# Patient Record
Sex: FEMALE | Race: BLACK OR AFRICAN AMERICAN | NOT HISPANIC OR LATINO
[De-identification: names, ages, dates, MRNs, and addresses within clinical notes are randomized per-mention and may not be internally consistent; named-entity substitution may affect disease eponyms.]

---

## 2018-05-24 ENCOUNTER — RECORD ABSTRACTING (OUTPATIENT)
Age: 68
End: 2018-05-24

## 2018-05-24 DIAGNOSIS — Z78.9 OTHER SPECIFIED HEALTH STATUS: ICD-10-CM

## 2018-05-24 PROBLEM — Z00.00 ENCOUNTER FOR PREVENTIVE HEALTH EXAMINATION: Status: ACTIVE | Noted: 2018-05-24

## 2018-05-24 RX ORDER — MELATONIN 3 MG
25 MCG TABLET ORAL
Refills: 0 | Status: ACTIVE | COMMUNITY

## 2018-07-02 ENCOUNTER — APPOINTMENT (OUTPATIENT)
Dept: BREAST CENTER | Facility: CLINIC | Age: 68
End: 2018-07-02
Payer: MEDICARE

## 2018-07-02 VITALS
DIASTOLIC BLOOD PRESSURE: 72 MMHG | WEIGHT: 175 LBS | HEIGHT: 62 IN | SYSTOLIC BLOOD PRESSURE: 139 MMHG | BODY MASS INDEX: 32.2 KG/M2 | HEART RATE: 80 BPM

## 2018-07-02 PROCEDURE — 99215 OFFICE O/P EST HI 40 MIN: CPT

## 2019-02-22 ENCOUNTER — MOBILE ON CALL (OUTPATIENT)
Age: 69
End: 2019-02-22

## 2019-02-22 ENCOUNTER — OTHER (OUTPATIENT)
Age: 69
End: 2019-02-22

## 2019-07-15 ENCOUNTER — APPOINTMENT (OUTPATIENT)
Dept: BREAST CENTER | Facility: CLINIC | Age: 69
End: 2019-07-15
Payer: MEDICARE

## 2019-07-24 ENCOUNTER — APPOINTMENT (OUTPATIENT)
Dept: BREAST CENTER | Facility: CLINIC | Age: 69
End: 2019-07-24
Payer: MEDICARE

## 2019-07-24 VITALS
HEIGHT: 62 IN | BODY MASS INDEX: 31.65 KG/M2 | DIASTOLIC BLOOD PRESSURE: 73 MMHG | HEART RATE: 79 BPM | SYSTOLIC BLOOD PRESSURE: 138 MMHG | WEIGHT: 172 LBS

## 2019-07-24 DIAGNOSIS — Z86.79 PERSONAL HISTORY OF OTHER DISEASES OF THE CIRCULATORY SYSTEM: ICD-10-CM

## 2019-07-24 DIAGNOSIS — Z86.39 PERSONAL HISTORY OF OTHER ENDOCRINE, NUTRITIONAL AND METABOLIC DISEASE: ICD-10-CM

## 2019-07-24 PROCEDURE — 99215 OFFICE O/P EST HI 40 MIN: CPT

## 2019-07-24 NOTE — HISTORY OF PRESENT ILLNESS
[FreeTextEntry1] :  This is a 67 yo female s/p LPMx/ALND 1999 (Gross/NSLIKYLIE), 3/13, stage 2 triple negative breast cancer. S/p chemo, RTx. She is BRCA negative. She is s/p right core bx 2011 (Sahgal) which showed fat necrosis.\par \par Patient's daughter was diagnosed with ALH/LCIS classic type on 01/30/2017. Patient continues to c/o of occasional right breast pinching which she states is on the left only. She describes it as a "needle/sharp sensation". She is not experiences for one week. Itself results. She is also complaining of sharp pain in the left upper arm which she states lasts a few minutes. She had one episode last week. She also thinks it was due to exit the lining in April 2018 during a vacation. She states this has completely resolved.\par \par She does SBE. \par She has not noticed a change in her breast or a breast lump.\par She has not noticed a change in her nipple or nipple area.\par She has not noticed a change in the skin of the breast.\par She is not experiencing nipple discharge.\par She is not experiencing breast pain. \par She has not noticed a lump or lymph node under the armpit. \par \par BREAST CANCER RISK FACTORS\par Menarche: 13\par Date of LMP: 1999\par Menopause: 48\par Grav: 3     Para: 3\par Age at first live birth: 18\par Nursed: Y\par Hysterectomy: N\par Oophorectomy: N\par OCP: Y 1y\par HRT: N\par Last pap/pelvic exam:  06/2017 WNL\par Related family history: sister ovarian CA 62\par Ashkenazi: N\par Tyrer-Yoan/NCI lifetime risk: n/a\par BRCA testing: Y negative\par \par Last mammogram: 7/12/2019               Location: [place]                  \par Report reviewed.                                                         Images reviewed \par Results: Birads 2\par No evidence of malignancy \par \par Last ultrasound:     7/12/2019                  [place]                  \par Report reviewed.                                                         Images reviewed \par Results: Birads 2\par No evidence of malignancy \par \par Last MRI: 4/3/2019                                     [place]\par Results: birads 2\par postoperative changes bilaterally. no suspicious MRI abnormality.

## 2019-07-24 NOTE — ASSESSMENT
[FreeTextEntry1] : This is a 68-year-old female with history of left triple negative breast cancer stage II, MARGARITO\par Due for bilateral mammogram ultrasound July 2020\par Discussed with her breast MRI for evaluation of left breast pain which has been chronic and given her triple negative breast cancer stage II status\par MRI 2021 given 67yo status, can skip 2020\par We reviewed risk reduction strategies including maintaining a BMI <25, limiting red meat intake and alcoholic beverages to 3 per week and exercise (150 min/ week low intensity or 75 min/week high intensity). And maintaining a normal vitamin D level.\par \par She knows to call or return sooner should any concerns or questions arise.\par

## 2019-07-24 NOTE — PHYSICAL EXAM
[Normocephalic] : normocephalic [Atraumatic] : atraumatic [Supple] : supple [No Supraclavicular Adenopathy] : no supraclavicular adenopathy [Examined in the supine and seated position] : examined in the supine and seated position [Symmetrical] : symmetrical [No dominant masses] : no dominant masses in right breast  [No dominant masses] : no dominant masses left breast [No Nipple Retraction] : no left nipple retraction [No Nipple Discharge] : no left nipple discharge [No Axillary Lymphadenopathy] : no left axillary lymphadenopathy [Soft] : abdomen soft [Not Tender] : non-tender [No Edema] : no edema [No Rashes] : no rashes [No Ulceration] : no ulceration [de-identified] : Status post partial mastectomy left breast moderate brawny induration, slight superior scar retraction 12:00 left nipple areolar complex, no palpable mass [de-identified] : Healed axillary incision [de-identified] : Healed Alejo pattern bilaterally, healed right anterior chest wall port scar [de-identified] : right upper extremity larger than left secondary to vascular insufficiency

## 2020-07-29 ENCOUNTER — APPOINTMENT (OUTPATIENT)
Dept: BREAST CENTER | Facility: CLINIC | Age: 70
End: 2020-07-29
Payer: MEDICARE

## 2020-07-29 VITALS
HEART RATE: 73 BPM | BODY MASS INDEX: 32.2 KG/M2 | DIASTOLIC BLOOD PRESSURE: 87 MMHG | HEIGHT: 62 IN | SYSTOLIC BLOOD PRESSURE: 175 MMHG | WEIGHT: 175 LBS

## 2020-07-29 PROCEDURE — 99215 OFFICE O/P EST HI 40 MIN: CPT

## 2020-07-29 NOTE — HISTORY OF PRESENT ILLNESS
[FreeTextEntry1] :  This is a 70 yo female s/p LPMx/ALND 1999 (Gross/NSAPOLINAR), 3/13, stage 2 triple negative breast cancer. S/p chemo, RTx. She is BRCA negative. She is s/p right core bx 2011 ([facility]) which showed fat necrosis.\par \par Patient's daughter was diagnosed with ALH/LCIS classic type on 01/30/2017. Patient continues to c/o of occasional right breast pinching which she states is on the left only. She describes it as a "needle/sharp sensation". She is not experiences for one week. Itself results. She is also complaining of sharp pain in the left upper arm which she states lasts a few minutes. She had one episode last week. She also thinks it was due tozip lining in April 2018 during a vacation. She states this has completely resolved.\par \par She does SBE. \par She has not noticed a change in her breast or a breast lump.\par She has not noticed a change in her nipple or nipple area.\par She has not noticed a change in the skin of the breast.\par She is not experiencing nipple discharge.\par She is not experiencing breast pain. \par She has not noticed a lump or lymph node under the armpit. \par \par BREAST CANCER RISK FACTORS\par Menarche: 13\par Date of LMP: 1999\par Menopause: 48\par Grav: 3     Para: 3\par Age at first live birth: 18\par Nursed: Y\par Hysterectomy: N\par Oophorectomy: N\par OCP: Y 1y\par HRT: N\par Last pap/pelvic exam:  06/2017 WNL\par Related family history: sister ovarian CA 62\par Ashkenazi: N\par Tyrer-Yoan/NCI lifetime risk: n/a\par BRCA testing: Y negative\par \par Last mammogram: 7/29/2020               [place]                  \par Report reviewed.                                                         Images reviewed \par Results: Birads\par Report pending\par \par Last ultrasound:     7/29/2020              [place]                  \par Report reviewed.                                                         Images reviewed \par Results: Birads 0\par Report pending\par \par Last MRI: 4/3/2019                                     [place]\par Results: birads 2\par postoperative changes bilaterally. no suspicious MRI abnormality.

## 2020-07-29 NOTE — ASSESSMENT
[FreeTextEntry1] : This is a 69-year-old female with history of left triple negative breast cancer stage II, MARGARITO\par Due for bilateral mammogram ultrasound July 2021- as long as everything normal today, recalled for motion artifact\par Discussed with her breast MRI for evaluation of left breast pain which has been chronic and given her triple negative breast cancer stage II status\par MRI 2021 given 67yo status, can skip 2020\par We reviewed risk reduction strategies including maintaining a BMI <25, limiting red meat intake and alcoholic beverages to 3 per week and exercise (150 min/ week low intensity or 75 min/week high intensity). And maintaining a normal vitamin D level.\par cont surveillance with Dr. Joseph\par \par She knows to call or return sooner should any concerns or questions arise.\par

## 2020-07-29 NOTE — REVIEW OF SYSTEMS
[Night Sweats] : night sweats [Tinnitus] : tinnitus [Snoring] : snoring [Negative] : Heme/Lymph [Joint Pain] : joint pain [Itching] : itching [Sleep Disturbances] : sleep disturbances [Fever] : no fever [Recent ___ Lb Weight Gain] : no recent weight gain [Earache] : no earache [Constipation] : no constipation [Dizziness] : no dizziness

## 2020-07-29 NOTE — PHYSICAL EXAM
[Normocephalic] : normocephalic [Atraumatic] : atraumatic [Supple] : supple [No Supraclavicular Adenopathy] : no supraclavicular adenopathy [Examined in the supine and seated position] : examined in the supine and seated position [Symmetrical] : symmetrical [No dominant masses] : no dominant masses in right breast  [No dominant masses] : no dominant masses left breast [No Nipple Retraction] : no left nipple retraction [No Nipple Discharge] : no left nipple discharge [No Axillary Lymphadenopathy] : no right axillary lymphadenopathy [Soft] : abdomen soft [Not Tender] : non-tender [No Edema] : no edema [No Rashes] : no rashes [No Ulceration] : no ulceration [de-identified] : Healed Alejo pattern bilaterally, healed right anterior chest wall port scar [de-identified] : Status post partial mastectomy left breast moderate brawny induration, slight superior scar retraction 12:00 left nipple areolar complex, no palpable mass [de-identified] : Healed axillary incision [de-identified] : right upper extremity larger than left secondary to vascular insufficiency

## 2021-08-09 ENCOUNTER — NON-APPOINTMENT (OUTPATIENT)
Age: 71
End: 2021-08-09

## 2021-08-09 ENCOUNTER — APPOINTMENT (OUTPATIENT)
Dept: BREAST CENTER | Facility: CLINIC | Age: 71
End: 2021-08-09
Payer: MEDICARE

## 2021-08-09 VITALS
DIASTOLIC BLOOD PRESSURE: 88 MMHG | HEIGHT: 62 IN | WEIGHT: 175 LBS | HEART RATE: 70 BPM | SYSTOLIC BLOOD PRESSURE: 189 MMHG | BODY MASS INDEX: 32.2 KG/M2

## 2021-08-09 DIAGNOSIS — Z80.0 FAMILY HISTORY OF MALIGNANT NEOPLASM OF DIGESTIVE ORGANS: ICD-10-CM

## 2021-08-09 DIAGNOSIS — Z80.41 FAMILY HISTORY OF MALIGNANT NEOPLASM OF OVARY: ICD-10-CM

## 2021-08-09 PROCEDURE — 99214 OFFICE O/P EST MOD 30 MIN: CPT

## 2021-08-09 RX ORDER — METFORMIN HYDROCHLORIDE 500 MG/1
500 TABLET, COATED ORAL
Refills: 0 | Status: DISCONTINUED | COMMUNITY
End: 2021-08-09

## 2021-08-09 NOTE — HISTORY OF PRESENT ILLNESS
[FreeTextEntry1] : This is a 69 yo female s/p LPMx/ALND 1999 (Gross/NSLIKYLIE), 3/13, stage 2 triple negative breast cancer. S/p chemo, RTx. She is BRCA negative. She is s/p right core bx 2011 (Sahgal) which showed fat necrosis.\par \par Patient's daughter was diagnosed with ALH/LCIS classic type on 01/30/2017. Patient continues to c/o of occasional right breast pinching which she states is on the left only. She describes it as a "needle/sharp sensation". She is not experiences for one week. Itself results. She is also complaining of sharp pain in the left upper arm which she states lasts a few minutes. She had one episode last week. She also thinks it was due to zip lining in April 2018 during a vacation. She states this has completely resolved.\par She has followed with Dr. Cortes plastic surgery [facility].\par \par She does SBE. \par She has not noticed a change in her breast or a breast lump.\par She has not noticed a change in her nipple or nipple area.\par She has not noticed a change in the skin of the breast.\par She is not experiencing nipple discharge.\par She is not experiencing breast pain. \par She has not noticed a lump or lymph node under the armpit. \par \par BREAST CANCER RISK FACTORS\par Menarche: 13\par Date of LMP: 1999\par Menopause: 48\par Grav: 3     Para: 3\par Age at first live birth: 18\par Nursed: Y\par Hysterectomy: N\par Oophorectomy: N\par OCP: Y 1y\par HRT: N\par Last pap/pelvic exam:  06/2017 WNL\par Related family history: sister ovarian CA 62\par Ashkenazi: N\par Tyrer-Whit[place]/NCI lifetime risk: n/a\par BRCA testing: Y negative\par \par Last mammogram: 8/9/2021               [place]                  \par Report reviewed.                                Images reviewed \par Results: Birads 2\par no evidence of malignancy.\par \par Last ultrasound:   8/9/2021              [place]                  \par Report reviewed.                             Images reviewed \par Results: Birads 2\par no evidence of malignancy. \par \par Last MRI: 4/3/2019                                     [place]\par Results: birads 2\par postoperative changes bilaterally. no suspicious MRI abnormality.

## 2021-08-09 NOTE — PHYSICAL EXAM
[Normocephalic] : normocephalic [Atraumatic] : atraumatic [Supple] : supple [No Supraclavicular Adenopathy] : no supraclavicular adenopathy [Examined in the supine and seated position] : examined in the supine and seated position [Symmetrical] : symmetrical [No dominant masses] : no dominant masses in right breast  [No dominant masses] : no dominant masses left breast [No Nipple Retraction] : no left nipple retraction [No Nipple Discharge] : no left nipple discharge [No Axillary Lymphadenopathy] : no left axillary lymphadenopathy [Soft] : abdomen soft [Not Tender] : non-tender [No Edema] : no edema [No Rashes] : no rashes [No Ulceration] : no ulceration [de-identified] : Healed Alejo pattern bilaterally, healed right anterior chest wall port scar [de-identified] : Status post partial mastectomy left breast moderate brawny induration, slight superior scar retraction 12:00 left nipple areolar complex, no palpable mass [de-identified] : Healed axillary incision [de-identified] : right upper extremity larger than left secondary to vascular insufficiency

## 2021-08-09 NOTE — REVIEW OF SYSTEMS
[Night Sweats] : night sweats [Tinnitus] : tinnitus [Snoring] : snoring [Itching] : itching [Sleep Disturbances] : sleep disturbances [Negative] : Heme/Lymph [Fever] : no fever [Recent ___ Lb Weight Gain] : no recent weight gain [Earache] : no earache [Constipation] : no constipation [Dizziness] : no dizziness

## 2021-08-09 NOTE — ASSESSMENT
[FreeTextEntry1] : This is a 70-year-old female with history of left triple negative breast cancer stage II, MARGARITO\par Due for bilateral mammogram ultrasound AUG 2021\par discussed eczema of breast-she will keep a diary of the itchy\par She is unhappy with cosmetic outcome and will see her plastic surgeon \par discussed MRI but given unknown risk of contrast she will opt out for now\par \par We reviewed risk reduction strategies including maintaining a BMI <25, limiting red meat intake and alcoholic beverages to 3 per week and exercise (150 min/ week low intensity or 75 min/week high intensity). And maintaining a normal vitamin D level.\par \par cont surveillance with Dr. Joseph\par \par She knows to call or return sooner should any concerns or questions arise.\par

## 2022-08-09 ENCOUNTER — RESULT REVIEW (OUTPATIENT)
Age: 72
End: 2022-08-09

## 2022-08-10 ENCOUNTER — NON-APPOINTMENT (OUTPATIENT)
Age: 72
End: 2022-08-10

## 2022-08-10 ENCOUNTER — APPOINTMENT (OUTPATIENT)
Dept: BREAST CENTER | Facility: CLINIC | Age: 72
End: 2022-08-10

## 2022-08-10 VITALS
WEIGHT: 175 LBS | HEART RATE: 80 BPM | BODY MASS INDEX: 32.2 KG/M2 | SYSTOLIC BLOOD PRESSURE: 160 MMHG | HEIGHT: 62 IN | DIASTOLIC BLOOD PRESSURE: 80 MMHG

## 2022-08-10 DIAGNOSIS — G89.29 MASTODYNIA: ICD-10-CM

## 2022-08-10 DIAGNOSIS — N64.4 MASTODYNIA: ICD-10-CM

## 2022-08-10 DIAGNOSIS — R92.1 MAMMOGRAPHIC CALCIFICATION FOUND ON DIAGNOSTIC IMAGING OF BREAST: ICD-10-CM

## 2022-08-10 PROCEDURE — 99214 OFFICE O/P EST MOD 30 MIN: CPT

## 2022-08-10 RX ORDER — B-COMPLEX WITH VITAMIN C
TABLET ORAL
Refills: 0 | Status: ACTIVE | COMMUNITY

## 2022-08-10 NOTE — HISTORY OF PRESENT ILLNESS
[FreeTextEntry1] : This is a 70 yo female s/p LPMx/ALND 1999 (Gross/[facility]APOLINAR), 3/13, stage 2 triple negative breast cancer. S/p chemo, RTx. She is BRCA negative. She is s/p right core bx 2011 (Sahgal) which showed fat necrosis.\par \par Patient's daughter was diagnosed with ALH/LCIS classic type on 01/30/2017. Patient continues to c/o of occasional right breast pinching which she states is on the left only. She describes it as a "needle/sharp sensation". She is not experiences for one week. Itself results. She is also complaining of sharp pain in the left upper arm which she states lasts a few minutes. She had one episode last week. She also thinks it was due to zip lining in April 2018 during a vacation. She states this has completely resolved.\par She has followed with Dr. Cortes plastic surgery [facility].\par \par She does SBE. \par She has not noticed a change in her breast or a breast lump.\par She has not noticed a change in her nipple or nipple area.\par She has not noticed a change in the skin of the breast.\par She is not experiencing nipple discharge.\par She is not experiencing breast pain. She states she has occasional left squeezing sensation, 1 second and then a few minutes and then resolves; does not have aggravating or alleviating factors; but states she has been weightligting\par She has not noticed a lump or lymph node under the armpit. \par \par BREAST CANCER RISK FACTORS\par Menarche: 13\par Date of LMP: 1999\par Menopause: 48\par Grav: 3     Para: 3\par Age at first live birth: 18\par Nursed: Y\par Hysterectomy: N\par Oophorectomy: N\par OCP: Y 1y\par HRT: N\par Last pap/pelvic exam:  06/2017 WNL\par Related family history: sister ovarian CA 62\par Ashkenazi: N\par Tyrer-Yoan/NCI lifetime risk: n/a\par BRCA testing: Y negative\par \par Last mammogram: 8/10/2022              [place]                  \par Report reviewed.                                Images reviewed \par Results: Birads 2\par no evidence of malignancy.\par \par Last ultrasound:   8/10/2022              [place]                  \par Report reviewed.                             Images reviewed \par Results: Birads 2\par no evidence of malignancy. \par \par Last MRI: 4/3/2019                                     [place]\par Results: birads 2\par postoperative changes bilaterally. no suspicious MRI abnormality.

## 2022-08-10 NOTE — PHYSICAL EXAM
[Normocephalic] : normocephalic [Atraumatic] : atraumatic [Supple] : supple [No Supraclavicular Adenopathy] : no supraclavicular adenopathy [Examined in the supine and seated position] : examined in the supine and seated position [Symmetrical] : symmetrical [No dominant masses] : no dominant masses in right breast  [No dominant masses] : no dominant masses left breast [No Nipple Retraction] : no left nipple retraction [No Nipple Discharge] : no left nipple discharge [No Axillary Lymphadenopathy] : no left axillary lymphadenopathy [Soft] : abdomen soft [Not Tender] : non-tender [No Edema] : no edema [No Rashes] : no rashes [No Ulceration] : no ulceration [de-identified] : Healed Alejo pattern bilaterally, healed right anterior chest wall port scar [de-identified] : Status post partial mastectomy left breast moderate brawny induration, slight superior scar retraction 12:00 left nipple areolar complex, no palpable mass [de-identified] : Healed axillary incision [de-identified] : right upper extremity larger than left secondary to vascular insufficiency

## 2022-08-10 NOTE — ASSESSMENT
[FreeTextEntry1] : This is a 71-year-old female with history of left triple negative breast cancer stage II, MARGARITO\par Due for bilateral mammogram ultrasound AUG 2022\par discussed eczema of breast-she will keep a diary of the itchiness\par She is unhappy with cosmetic outcome and will see her plastic surgeon \par discussed MRI but given unknown risk of contrast she will opt out for now\par \par We reviewed risk reduction strategies including maintaining a BMI <25, limiting red meat intake and alcoholic beverages to 3 per week and exercise (150 min/ week low intensity or 75 min/week high intensity). And maintaining a normal vitamin D level.\par \par cont surveillance with , she has some anxiety over surveillance\par discussed likely muscular in etiology\par \par She knows to call or return sooner should any concerns or questions arise.\par

## 2023-03-22 ENCOUNTER — OFFICE (OUTPATIENT)
Dept: URBAN - METROPOLITAN AREA CLINIC 29 | Facility: CLINIC | Age: 73
Setting detail: OPHTHALMOLOGY
End: 2023-03-22
Payer: COMMERCIAL

## 2023-03-22 DIAGNOSIS — H52.4: ICD-10-CM

## 2023-03-22 DIAGNOSIS — H16.223: ICD-10-CM

## 2023-03-22 DIAGNOSIS — Z96.1: ICD-10-CM

## 2023-03-22 DIAGNOSIS — H25.092: ICD-10-CM

## 2023-03-22 DIAGNOSIS — E11.9: ICD-10-CM

## 2023-03-22 PROCEDURE — 92015 DETERMINE REFRACTIVE STATE: CPT | Performed by: OPHTHALMOLOGY

## 2023-03-22 PROCEDURE — 92014 COMPRE OPH EXAM EST PT 1/>: CPT | Performed by: OPHTHALMOLOGY

## 2023-03-22 ASSESSMENT — REFRACTION_AUTOREFRACTION
OD_CYLINDER: +0.25
OS_CYLINDER: +0.25
OD_SPHERE: -0.75
OS_SPHERE: +3.00
OD_AXIS: 005
OS_AXIS: 20

## 2023-03-22 ASSESSMENT — REFRACTION_MANIFEST
OD_ADD: +2.75
OS_ADD: +2.75
OD_CYLINDER: SPHERE
OS_VA1: 20/20
OD_VA2: 20/20(J1+)
OD_VA1: 20/20
OD_SPHERE: -0.75
OS_CYLINDER: SPHERE
OS_VA2: 20/20(J1+)
OS_SPHERE: +2.25

## 2023-03-22 ASSESSMENT — TONOMETRY
OS_IOP_MMHG: 20
OD_IOP_MMHG: 19

## 2023-03-22 ASSESSMENT — REFRACTION_CURRENTRX
OS_SPHERE: +2.25
OS_OVR_VA: 20/
OD_VPRISM_DIRECTION: PROGS
OS_CYLINDER: SPHERE
OD_CYLINDER: +0.25
OD_OVR_VA: 20/
OD_AXIS: 10
OS_VPRISM_DIRECTION: PROGS
OS_ADD: +3.00
OD_SPHERE: -0.75
OD_ADD: +3.00

## 2023-03-22 ASSESSMENT — VISUAL ACUITY
OS_BCVA: 20/20
OD_BCVA: 20/50-2

## 2023-03-22 ASSESSMENT — SPHEQUIV_DERIVED
OD_SPHEQUIV: -0.625
OS_SPHEQUIV: 3.125

## 2023-03-22 ASSESSMENT — CONFRONTATIONAL VISUAL FIELD TEST (CVF)
OS_FINDINGS: FULL
OD_FINDINGS: FULL

## 2023-08-10 ENCOUNTER — RESULT REVIEW (OUTPATIENT)
Age: 73
End: 2023-08-10

## 2023-09-11 ENCOUNTER — APPOINTMENT (OUTPATIENT)
Dept: BREAST CENTER | Facility: CLINIC | Age: 73
End: 2023-09-11
Payer: MEDICARE

## 2023-09-11 VITALS
WEIGHT: 175 LBS | HEART RATE: 80 BPM | BODY MASS INDEX: 32.2 KG/M2 | HEIGHT: 62 IN | SYSTOLIC BLOOD PRESSURE: 130 MMHG | DIASTOLIC BLOOD PRESSURE: 75 MMHG

## 2023-09-11 DIAGNOSIS — Z12.31 ENCOUNTER FOR SCREENING MAMMOGRAM FOR MALIGNANT NEOPLASM OF BREAST: ICD-10-CM

## 2023-09-11 DIAGNOSIS — Z92.3 PERSONAL HISTORY OF IRRADIATION: ICD-10-CM

## 2023-09-11 DIAGNOSIS — N60.19 DIFFUSE CYSTIC MASTOPATHY OF UNSPECIFIED BREAST: ICD-10-CM

## 2023-09-11 DIAGNOSIS — Z92.21 PERSONAL HISTORY OF ANTINEOPLASTIC CHEMOTHERAPY: ICD-10-CM

## 2023-09-11 DIAGNOSIS — Z85.3 PERSONAL HISTORY OF MALIGNANT NEOPLASM OF BREAST: ICD-10-CM

## 2023-09-11 PROCEDURE — 99214 OFFICE O/P EST MOD 30 MIN: CPT

## 2023-09-11 RX ORDER — LOSARTAN POTASSIUM 50 MG/1
50 TABLET, FILM COATED ORAL DAILY
Refills: 0 | Status: DISCONTINUED | COMMUNITY
End: 2023-09-11

## 2023-09-11 RX ORDER — OLMESARTAN MEDOXOMIL / AMLODIPINE BESYLATE / HYDROCHLOROTHIAZIDE 40; 10; 25 MG/1; MG/1; MG/1
TABLET, FILM COATED ORAL
Refills: 0 | Status: ACTIVE | COMMUNITY

## 2024-05-01 ENCOUNTER — OFFICE (OUTPATIENT)
Dept: URBAN - METROPOLITAN AREA CLINIC 29 | Facility: CLINIC | Age: 74
Setting detail: OPHTHALMOLOGY
End: 2024-05-01
Payer: COMMERCIAL

## 2024-05-01 DIAGNOSIS — H35.013: ICD-10-CM

## 2024-05-01 DIAGNOSIS — H52.4: ICD-10-CM

## 2024-05-01 DIAGNOSIS — H25.12: ICD-10-CM

## 2024-05-01 PROCEDURE — 92134 CPTRZ OPH DX IMG PST SGM RTA: CPT | Performed by: OPHTHALMOLOGY

## 2024-05-01 PROCEDURE — 92015 DETERMINE REFRACTIVE STATE: CPT | Performed by: OPHTHALMOLOGY

## 2024-05-01 PROCEDURE — 92136 OPHTHALMIC BIOMETRY: CPT | Performed by: OPHTHALMOLOGY

## 2024-05-01 PROCEDURE — 92025 CPTRIZED CORNEAL TOPOGRAPHY: CPT | Performed by: OPHTHALMOLOGY

## 2024-05-01 PROCEDURE — 92014 COMPRE OPH EXAM EST PT 1/>: CPT | Performed by: OPHTHALMOLOGY

## 2024-05-01 PROCEDURE — 92020 GONIOSCOPY: CPT | Performed by: OPHTHALMOLOGY

## 2024-05-01 ASSESSMENT — CONFRONTATIONAL VISUAL FIELD TEST (CVF)
OD_FINDINGS: FULL
OS_FINDINGS: FULL

## 2024-09-22 ENCOUNTER — RESULT REVIEW (OUTPATIENT)
Age: 74
End: 2024-09-22

## 2024-09-23 ENCOUNTER — APPOINTMENT (OUTPATIENT)
Dept: BREAST CENTER | Facility: CLINIC | Age: 74
End: 2024-09-23
Payer: MEDICARE

## 2024-09-23 VITALS
HEIGHT: 62 IN | HEART RATE: 73 BPM | BODY MASS INDEX: 29.44 KG/M2 | SYSTOLIC BLOOD PRESSURE: 127 MMHG | DIASTOLIC BLOOD PRESSURE: 58 MMHG | WEIGHT: 160 LBS

## 2024-09-23 DIAGNOSIS — Z12.31 ENCOUNTER FOR SCREENING MAMMOGRAM FOR MALIGNANT NEOPLASM OF BREAST: ICD-10-CM

## 2024-09-23 DIAGNOSIS — Z85.3 PERSONAL HISTORY OF MALIGNANT NEOPLASM OF BREAST: ICD-10-CM

## 2024-09-23 DIAGNOSIS — R92.2 INCONCLUSIVE MAMMOGRAM: ICD-10-CM

## 2024-09-23 DIAGNOSIS — R92.1 MAMMOGRAPHIC CALCIFICATION FOUND ON DIAGNOSTIC IMAGING OF BREAST: ICD-10-CM

## 2024-09-23 DIAGNOSIS — Z92.3 PERSONAL HISTORY OF IRRADIATION: ICD-10-CM

## 2024-09-23 DIAGNOSIS — R92.30 INCONCLUSIVE MAMMOGRAM: ICD-10-CM

## 2024-09-23 DIAGNOSIS — Z92.21 PERSONAL HISTORY OF ANTINEOPLASTIC CHEMOTHERAPY: ICD-10-CM

## 2024-09-23 DIAGNOSIS — N60.19 DIFFUSE CYSTIC MASTOPATHY OF UNSPECIFIED BREAST: ICD-10-CM

## 2024-09-23 PROCEDURE — 99214 OFFICE O/P EST MOD 30 MIN: CPT

## 2024-09-23 NOTE — HISTORY OF PRESENT ILLNESS
[FreeTextEntry1] : This is a 74 yo female s/p LPMx/ALND 1999 (Gross/SANDRITA), 3/13, stage 2 triple negative breast cancer. S/p chemo (Dr. Joseph), RTx. She is BRCA negative. She is s/p right core bx 2011 ([facility]gal) which showed fat necrosis.  Patient's daughter was diagnosed with ALH/LCIS classic type on 01/30/2017. Patient continues to c/o of occasional right breast pinching which she states is on the left only. She describes it as a "needle/sharp sensation". She is not experiences for one week. Itself results. She is also complaining of sharp pain in the left upper arm which she states lasts a few minutes. She had one-episode last week. She also thinks it was due to zip lining in April 2018 during a vacation. She states this has completely resolved. She has followed with Dr. Cortes plastic surgery [facility]. She has no breast complaints today except the asymmetry. She lost her DIL NOV 2023 to pancreatic cancer at age 48.  She does SBE.  She has not noticed a change in her breast or a breast lump. She has not noticed a change in her nipple or nipple area. She has not noticed a change in the skin of the breast. Her itchiness has improved a lot.  She is not experiencing nipple discharge. She is not experiencing breast pain.  She has not noticed a lump or lymph node under the armpit.   BREAST CANCER RISK FACTORS Menarche: 13 Date of LMP: 1999 Menopause: 48 Grav: 3     Para: 3 Age at first live birth: 18 Nursed: Y Hysterectomy: N Oophorectomy: N OCP: Y 1y HRT: N Last pap/pelvic exam:  06/2017 WNL Related family history: sister ovarian CA 62 Ashkenazi: N Tyrer-Yoan/NCI lifetime risk: n/a BRCA testing: Y negative  Last mammogram: 09/23/2024             [place]                   Report reviewed.                                Images reviewed.  Results: Birads 2 no evidence of malignancy.  Last ultrasound:  09/23/2024             [place]                   Report reviewed.                             Images reviewed.  Results: Birads 2 no evidence of malignancy.   Last MRI: 4/3/2019                                     [place] Results: birads 2 postoperative changes bilaterally. no suspicious MRI abnormality.

## 2024-09-23 NOTE — PHYSICAL EXAM
[Normocephalic] : normocephalic [Atraumatic] : atraumatic [Supple] : supple [No Supraclavicular Adenopathy] : no supraclavicular adenopathy [Examined in the supine and seated position] : examined in the supine and seated position [Symmetrical] : symmetrical [No dominant masses] : no dominant masses in right breast  [No dominant masses] : no dominant masses left breast [No Nipple Retraction] : no left nipple retraction [No Nipple Discharge] : no left nipple discharge [No Axillary Lymphadenopathy] : no left axillary lymphadenopathy [Soft] : abdomen soft [Not Tender] : non-tender [No Edema] : no edema [No Rashes] : no rashes [No Ulceration] : no ulceration [de-identified] : Healed Alejo pattern bilaterally, healed right anterior chest wall port scar [de-identified] : Status post partial mastectomy left breast moderate brawny induration, slight superior scar retraction 12:00 left nipple areolar complex, no palpable mass; R>>L [de-identified] : Healed axillary incision [de-identified] : right upper extremity larger than left secondary to vascular insufficiency

## 2024-09-23 NOTE — CONSULT LETTER
[Dear  ___] : Dear  [unfilled], [Courtesy Letter:] : I had the pleasure of seeing your patient, [unfilled], in my office today. [Please see my note below.] : Please see my note below. [Consult Closing:] : Thank you very much for allowing me to participate in the care of this patient.  If you have any questions, please do not hesitate to contact me. [Sincerely,] : Sincerely, [FreeTextEntry1] : I have advised her to see a cardiologist given her breast arterial calcification score. [FreeTextEntry3] : Katherine Mason [place] [address]

## 2024-09-23 NOTE — HISTORY OF PRESENT ILLNESS
[FreeTextEntry1] : This is a 72 yo female s/p LPMx/ALND 1999 (Gross/SANDRITA), 3/13, stage 2 triple negative breast cancer. S/p chemo (Dr. Joseph), RTx. She is BRCA negative. She is s/p right core bx 2011 (Sahgal) which showed fat necrosis.  Patient's daughter was diagnosed with ALH/LCIS classic type on 01/30/2017. Patient continues to c/o of occasional right breast pinching which she states is on the left only. She describes it as a "needle/sharp sensation". She is not experiences for one week. Itself results. She is also complaining of sharp pain in the left upper arm which she states lasts a few minutes. She had one-episode last week. She also thinks it was due to zip lining in April 2018 during a vacation. She states this has completely resolved. She has followed with Dr. Cortes plastic surgery [facility]. She has no breast complaints today except the asymmetry. She lost her DIL NOV 2023 to pancreatic cancer at age 48.  She does SBE.  She has not noticed a change in her breast or a breast lump. She has not noticed a change in her nipple or nipple area. She has not noticed a change in the skin of the breast. Her itchiness has improved a lot.  She is not experiencing nipple discharge. She is not experiencing breast pain.  She has not noticed a lump or lymph node under the armpit.   BREAST CANCER RISK FACTORS Menarche: 13 Date of LMP: 1999 Menopause: 48 Grav: 3     Para: 3 Age at first live birth: 18 Nursed: Y Hysterectomy: N Oophorectomy: N OCP: Y 1y HRT: N Last pap/pelvic exam:  06/2017 WNL Related family history: sister ovarian CA 62 Ashkenazi: N Tyrer-Yoan/NCI lifetime risk: n/a BRCA testing: Y negative  Last mammogram: 09/23/2024             [place]                   Report reviewed.                                Images reviewed.  Results: Birads 2 no evidence of malignancy.  Last ultrasound:  09/23/2024             [place]                   Report reviewed.                             Images reviewed.  Results: Birads 2 no evidence of malignancy.   Last MRI: 4/3/2019                                     [place] Results: birads 2 postoperative changes bilaterally. no suspicious MRI abnormality.

## 2024-09-23 NOTE — PHYSICAL EXAM
[Normocephalic] : normocephalic [Atraumatic] : atraumatic [Supple] : supple [No Supraclavicular Adenopathy] : no supraclavicular adenopathy [Examined in the supine and seated position] : examined in the supine and seated position [Symmetrical] : symmetrical [No dominant masses] : no dominant masses in right breast  [No dominant masses] : no dominant masses left breast [No Nipple Retraction] : no left nipple retraction [No Nipple Discharge] : no left nipple discharge [No Axillary Lymphadenopathy] : no left axillary lymphadenopathy [Soft] : abdomen soft [Not Tender] : non-tender [No Edema] : no edema [No Rashes] : no rashes [No Ulceration] : no ulceration [de-identified] : Healed Alejo pattern bilaterally, healed right anterior chest wall port scar [de-identified] : Status post partial mastectomy left breast moderate brawny induration, slight superior scar retraction 12:00 left nipple areolar complex, no palpable mass; R>>L [de-identified] : right upper extremity larger than left secondary to vascular insufficiency [de-identified] : Healed axillary incision

## 2024-09-23 NOTE — CONSULT LETTER
[Dear  ___] : Dear  [unfilled], [Courtesy Letter:] : I had the pleasure of seeing your patient, [unfilled], in my office today. [Please see my note below.] : Please see my note below. [Consult Closing:] : Thank you very much for allowing me to participate in the care of this patient.  If you have any questions, please do not hesitate to contact me. [Sincerely,] : Sincerely, [FreeTextEntry1] : I have advised her to see a cardiologist given her breast arterial calcification score. [FreeTextEntry3] : Katherine Mason [place] [address]

## 2024-09-23 NOTE — ASSESSMENT
[FreeTextEntry1] : This is a 73-year-old female with history of left triple negative breast cancer stage II, MARGARITO Due for bilateral mammogram ultrasound SEPT 2025 I have advised her to see a cardiologist given her breast arterial calcification score.  She is unhappy with cosmetic outcome and will see her plastic surgeon  discussed MRI but given unknown risk of contrast she will opt out for now-if nipple burning returns she will call me and I will order MRI  discussed importance of gyn screening and she is declining   We reviewed risk reduction strategies including maintaining a BMI <25, limiting red meat intake and alcoholic beverages to 3 per week and exercise (150 min/ week low intensity or 75 min/week high intensity). And maintaining a normal vitamin D level.  She has decided to no longer follow up with Dr. Joseph, she has some anxiety over surveillance and does not want another med/onc discussed pain likely muscular in etiology f/u with me 1 year She knows to call or return sooner should any concerns or questions arise.

## 2025-07-09 ENCOUNTER — OFFICE (OUTPATIENT)
Dept: URBAN - METROPOLITAN AREA CLINIC 29 | Facility: CLINIC | Age: 75
Setting detail: OPHTHALMOLOGY
End: 2025-07-09
Payer: COMMERCIAL

## 2025-07-09 DIAGNOSIS — E11.9: ICD-10-CM

## 2025-07-09 DIAGNOSIS — H16.223: ICD-10-CM

## 2025-07-09 DIAGNOSIS — H25.12: ICD-10-CM

## 2025-07-09 DIAGNOSIS — Z96.1: ICD-10-CM

## 2025-07-09 PROCEDURE — 92014 COMPRE OPH EXAM EST PT 1/>: CPT | Performed by: OPHTHALMOLOGY

## 2025-07-09 ASSESSMENT — REFRACTION_CURRENTRX
OS_SPHERE: +2.25
OD_ADD: +3.00
OS_VPRISM_DIRECTION: PROGS
OD_SPHERE: -0.75
OD_OVR_VA: 20/
OD_AXIS: 10
OD_VPRISM_DIRECTION: PROGS
OS_CYLINDER: SPHERE
OS_OVR_VA: 20/
OS_ADD: +3.00
OD_CYLINDER: +0.25

## 2025-07-09 ASSESSMENT — REFRACTION_AUTOREFRACTION
OD_SPHERE: -0.50
OS_SPHERE: +2.50
OD_CYLINDER: +0.25
OS_AXIS: 085
OS_CYLINDER: +0.25
OD_AXIS: 050

## 2025-07-09 ASSESSMENT — CONFRONTATIONAL VISUAL FIELD TEST (CVF)
OS_FINDINGS: FULL
OD_FINDINGS: FULL

## 2025-07-09 ASSESSMENT — REFRACTION_MANIFEST
OS_VA1: 20/40-3
OS_SPHERE: +2.50
OD_VA1: 20/20
OD_SPHERE: -0.75
OS_VA2: 20/20(J1+)
OS_CYLINDER: SPHERE
OD_CYLINDER: SPHERE
OS_ADD: +2.75
OD_ADD: +2.75
OD_VA2: 20/20(J1+)

## 2025-07-09 ASSESSMENT — VISUAL ACUITY
OD_BCVA: 20/40-1
OS_BCVA: 20/25